# Patient Record
Sex: FEMALE | Race: WHITE
[De-identification: names, ages, dates, MRNs, and addresses within clinical notes are randomized per-mention and may not be internally consistent; named-entity substitution may affect disease eponyms.]

---

## 2021-10-26 ENCOUNTER — APPOINTMENT (OUTPATIENT)
Dept: OPHTHALMOLOGY | Facility: CLINIC | Age: 61
End: 2021-10-26

## 2021-11-08 ENCOUNTER — APPOINTMENT (OUTPATIENT)
Dept: OBGYN | Facility: CLINIC | Age: 61
End: 2021-11-08

## 2022-06-17 ENCOUNTER — APPOINTMENT (OUTPATIENT)
Dept: OBGYN | Facility: CLINIC | Age: 62
End: 2022-06-17
Payer: COMMERCIAL

## 2022-06-17 VITALS
BODY MASS INDEX: 18.96 KG/M2 | WEIGHT: 125.13 LBS | TEMPERATURE: 97 F | RESPIRATION RATE: 18 BRPM | HEIGHT: 68 IN | HEART RATE: 74 BPM | SYSTOLIC BLOOD PRESSURE: 123 MMHG | DIASTOLIC BLOOD PRESSURE: 66 MMHG

## 2022-06-17 DIAGNOSIS — Z12.31 ENCOUNTER FOR SCREENING MAMMOGRAM FOR MALIGNANT NEOPLASM OF BREAST: ICD-10-CM

## 2022-06-17 DIAGNOSIS — N95.2 POSTMENOPAUSAL ATROPHIC VAGINITIS: ICD-10-CM

## 2022-06-17 DIAGNOSIS — Z82.49 FAMILY HISTORY OF ISCHEMIC HEART DISEASE AND OTHER DISEASES OF THE CIRCULATORY SYSTEM: ICD-10-CM

## 2022-06-17 DIAGNOSIS — Z80.3 FAMILY HISTORY OF MALIGNANT NEOPLASM OF BREAST: ICD-10-CM

## 2022-06-17 DIAGNOSIS — R23.2 FLUSHING: ICD-10-CM

## 2022-06-17 DIAGNOSIS — Z01.419 ENCOUNTER FOR GYNECOLOGICAL EXAMINATION (GENERAL) (ROUTINE) W/OUT ABNORMAL FINDINGS: ICD-10-CM

## 2022-06-17 DIAGNOSIS — Z78.9 OTHER SPECIFIED HEALTH STATUS: ICD-10-CM

## 2022-06-17 PROCEDURE — 99386 PREV VISIT NEW AGE 40-64: CPT

## 2022-06-17 RX ORDER — AMOXICILLIN 875 MG/1
875 TABLET, FILM COATED ORAL
Qty: 14 | Refills: 0 | Status: COMPLETED | COMMUNITY
Start: 2022-05-03

## 2022-06-17 RX ORDER — LEVOTHYROXINE SODIUM 100 UG/1
100 TABLET ORAL
Refills: 0 | Status: ACTIVE | COMMUNITY

## 2022-06-17 RX ORDER — ERYTHROMYCIN 5 MG/G
5 OINTMENT OPHTHALMIC
Qty: 4 | Refills: 0 | Status: COMPLETED | COMMUNITY
Start: 2022-03-29

## 2022-06-17 RX ORDER — LEVOTHYROXINE SODIUM 100 UG/1
100 TABLET ORAL
Qty: 90 | Refills: 0 | Status: COMPLETED | COMMUNITY
Start: 2022-05-25

## 2022-06-17 RX ORDER — ESTRADIOL 0.1 MG/G
0.1 CREAM VAGINAL
Qty: 1 | Refills: 3 | Status: ACTIVE | COMMUNITY
Start: 2022-06-17 | End: 1900-01-01

## 2022-06-17 NOTE — HISTORY OF PRESENT ILLNESS
[Patient reported mammogram was normal] : Patient reported mammogram was normal [Currently Active] : currently active [Men] : men [Vaginal] : vaginal [Patient reported PAP Smear was normal] : Patient reported PAP Smear was normal [TextBox_4] : Former patient of Dr. Sandhu, last saw him 6/14, then moved to [place] and saw someone 5 yrs ago, pap normal, no hx of abn paps. Moved back to .\par C/o pain with intercourse on insertion despite using lubricant.\par Had BMD done last year due to stress fx from running, BMD showed osteopenia. She takes vit d and exercises.\par \par hx hemorrhoids [Mammogramdate] : 1.5 yrs ago [PapSmeardate] : 5 yrs ago [BoneDensityDate] : 1 yr ago [TextBox_37] : osteopenia [ColonoscopyDate] : never [FreeTextEntry1] : pain on insertion for a few months

## 2022-06-17 NOTE — PHYSICAL EXAM
[Appropriately responsive] : appropriately responsive [Alert] : alert [No Acute Distress] : no acute distress [Soft] : soft [Non-tender] : non-tender [Non-distended] : non-distended [No HSM] : No HSM [No Lesions] : no lesions [No Mass] : no mass [Oriented x3] : oriented x3 [Examination Of The Breasts] : a normal appearance [] : implants [No Masses] : no breast masses were palpable [Vulvar Atrophy] : vulvar atrophy [Labia Majora] : normal [Labia Minora] : normal [Atrophy] : atrophy [Normal] : normal [Tenderness] : nontender [Enlarged ___ wks] : not enlarged [Mass ___ cm] : no uterine mass was palpated [Uterine Adnexae] : non-palpable [No Tenderness] : no tenderness [FreeTextEntry5] : pap done [FreeTextEntry9] : guaiac-

## 2022-06-21 LAB — HPV HIGH+LOW RISK DNA PNL CVX: NOT DETECTED

## 2022-06-22 LAB — CYTOLOGY CVX/VAG DOC THIN PREP: ABNORMAL

## 2022-12-17 ENCOUNTER — OFFICE (OUTPATIENT)
Dept: URBAN - METROPOLITAN AREA CLINIC 12 | Facility: CLINIC | Age: 62
Setting detail: OPHTHALMOLOGY
End: 2022-12-17
Payer: COMMERCIAL

## 2022-12-17 DIAGNOSIS — H16.223: ICD-10-CM

## 2022-12-17 DIAGNOSIS — H26.493: ICD-10-CM

## 2022-12-17 PROCEDURE — 92012 INTRM OPH EXAM EST PATIENT: CPT | Performed by: OPTOMETRIST

## 2022-12-17 ASSESSMENT — REFRACTION_AUTOREFRACTION
OS_SPHERE: -0.25
OS_CYLINDER: -0.50
OD_CYLINDER: -0.75
OD_AXIS: 158
OS_AXIS: 179
OD_SPHERE: -1.75

## 2022-12-17 ASSESSMENT — AXIALLENGTH_DERIVED
OD_AL: 24.0617
OS_AL: 23.3305
OD_AL: 24.0617
OD_AL: 29.0087
OS_AL: 28.1592

## 2022-12-17 ASSESSMENT — REFRACTION_CURRENTRX
OD_SPHERE: -18.00
OD_CYLINDER: -2.75
OD_OVR_VA: 20/
OS_VPRISM_DIRECTION: SV
OS_SPHERE: -17.25
OS_OVR_VA: 20/
OS_AXIS: 113
OD_VPRISM_DIRECTION: SV
OD_AXIS: 034
OS_CYLINDER: -3.25

## 2022-12-17 ASSESSMENT — REFRACTION_MANIFEST
OS_ADD: +2.50
OD_VA1: 20/40
OS_SPHERE: PLANO
OD_CYLINDER: -1.25
OD_AXIS: 180
OS_AXIS: 143
OS_VA1: 20/50+1
OD_ADD: +2.50
OS_CYLINDER: -2.00
OD_VA2: 20/30(J2)
OD_CYLINDER: -2.50
OD_AXIS: 025
OD_VA1: 20/40
OS_VA2: 20/30(J2)
OS_CYLINDER: -0.50
OD_SPHERE: -1.50
OS_VA1: 20/40
OS_SPHERE: -10.00
OD_SPHERE: -11.00
OS_AXIS: 150

## 2022-12-17 ASSESSMENT — KERATOMETRY
OS_K1POWER_DIOPTERS: 44.50
OD_K2POWER_DIOPTERS: 45.25
OD_AXISANGLE_DEGREES: 81
OS_K2POWER_DIOPTERS: 45.00
OS_AXISANGLE_DEGREES: 84
OD_K1POWER_DIOPTERS: 43.75
METHOD_AUTO_MANUAL: AUTO

## 2022-12-17 ASSESSMENT — TONOMETRY
OD_IOP_MMHG: 17
OS_IOP_MMHG: 14

## 2022-12-17 ASSESSMENT — VISUAL ACUITY
OS_BCVA: 20/50-1
OD_BCVA: 20/30-2

## 2022-12-17 ASSESSMENT — SPHEQUIV_DERIVED
OS_SPHEQUIV: -0.5
OD_SPHEQUIV: -12.25
OD_SPHEQUIV: -2.125
OS_SPHEQUIV: -11
OD_SPHEQUIV: -2.125

## 2022-12-17 ASSESSMENT — CONFRONTATIONAL VISUAL FIELD TEST (CVF)
OS_FINDINGS: FULL
OD_FINDINGS: FULL

## 2022-12-17 ASSESSMENT — SUPERFICIAL PUNCTATE KERATITIS (SPK)
OD_SPK: T 1+
OS_SPK: T 1+

## 2022-12-21 ENCOUNTER — OFFICE (OUTPATIENT)
Dept: URBAN - METROPOLITAN AREA CLINIC 12 | Facility: CLINIC | Age: 62
Setting detail: OPHTHALMOLOGY
End: 2022-12-21
Payer: COMMERCIAL

## 2022-12-21 DIAGNOSIS — Z96.1: ICD-10-CM

## 2022-12-21 DIAGNOSIS — H44.23: ICD-10-CM

## 2022-12-21 DIAGNOSIS — H26.493: ICD-10-CM

## 2022-12-21 DIAGNOSIS — H43.393: ICD-10-CM

## 2022-12-21 DIAGNOSIS — H16.223: ICD-10-CM

## 2022-12-21 DIAGNOSIS — H35.371: ICD-10-CM

## 2022-12-21 DIAGNOSIS — H35.413: ICD-10-CM

## 2022-12-21 PROCEDURE — 92014 COMPRE OPH EXAM EST PT 1/>: CPT | Performed by: OPTOMETRIST

## 2022-12-21 PROCEDURE — 92134 CPTRZ OPH DX IMG PST SGM RTA: CPT | Performed by: OPTOMETRIST

## 2022-12-21 ASSESSMENT — SPHEQUIV_DERIVED
OS_SPHEQUIV: -11
OD_SPHEQUIV: -1.75
OD_SPHEQUIV: -1.875
OS_SPHEQUIV: -0.75
OD_SPHEQUIV: -12.25

## 2022-12-21 ASSESSMENT — VISUAL ACUITY
OD_BCVA: 20/30-2
OS_BCVA: 20/50+2

## 2022-12-21 ASSESSMENT — REFRACTION_MANIFEST
OS_AXIS: 160
OS_AXIS: 150
OS_CYLINDER: -2.00
OD_CYLINDER: -2.50
OS_CYLINDER: -0.50
OD_VA1: 20/40
OS_VA1: 20/50+1
OD_VA2: 20/30(J2)
OD_VA1: 20/40
OD_AXIS: 170
OS_SPHERE: -0.50
OD_AXIS: 025
OU_VA: 20/30-
OS_SPHERE: -10.00
OS_VA2: 20/30(J2)
OD_SPHERE: -1.50
OS_VA1: 20/40
OD_CYLINDER: -0.50
OD_SPHERE: -11.00

## 2022-12-21 ASSESSMENT — REFRACTION_AUTOREFRACTION
OD_AXIS: 168
OS_AXIS: 162
OD_SPHERE: -1.50
OD_CYLINDER: -0.75
OS_CYLINDER: -0.75
OS_SPHERE: PLANO

## 2022-12-21 ASSESSMENT — AXIALLENGTH_DERIVED
OD_AL: 23.9107
OD_AL: 29.0087
OS_AL: 28.2248
OS_AL: 23.4715
OD_AL: 23.9608

## 2022-12-21 ASSESSMENT — REFRACTION_CURRENTRX
OS_SPHERE: -17.25
OD_VPRISM_DIRECTION: SV
OS_CYLINDER: -3.25
OS_AXIS: 113
OD_CYLINDER: -2.75
OD_SPHERE: -18.00
OS_OVR_VA: 20/
OD_OVR_VA: 20/
OD_AXIS: 034
OS_VPRISM_DIRECTION: SV

## 2022-12-21 ASSESSMENT — TONOMETRY
OS_IOP_MMHG: 14
OD_IOP_MMHG: 14

## 2022-12-21 ASSESSMENT — SUPERFICIAL PUNCTATE KERATITIS (SPK)
OD_SPK: T 1+
OS_SPK: T 1+

## 2022-12-21 ASSESSMENT — KERATOMETRY
OD_AXISANGLE_DEGREES: 066
OD_K1POWER_DIOPTERS: 44.25
OD_K2POWER_DIOPTERS: 44.75
OS_AXISANGLE_DEGREES: 086
METHOD_AUTO_MANUAL: AUTO
OS_K2POWER_DIOPTERS: 45.00
OS_K1POWER_DIOPTERS: 44.25

## 2022-12-21 ASSESSMENT — CONFRONTATIONAL VISUAL FIELD TEST (CVF)
OD_FINDINGS: FULL
OS_FINDINGS: FULL

## 2023-06-22 ENCOUNTER — OFFICE (OUTPATIENT)
Dept: URBAN - METROPOLITAN AREA CLINIC 12 | Facility: CLINIC | Age: 63
Setting detail: OPHTHALMOLOGY
End: 2023-06-22
Payer: COMMERCIAL

## 2023-06-22 DIAGNOSIS — H44.23: ICD-10-CM

## 2023-06-22 DIAGNOSIS — H35.371: ICD-10-CM

## 2023-06-22 DIAGNOSIS — H26.493: ICD-10-CM

## 2023-06-22 DIAGNOSIS — Z96.1: ICD-10-CM

## 2023-06-22 DIAGNOSIS — H16.223: ICD-10-CM

## 2023-06-22 DIAGNOSIS — H43.393: ICD-10-CM

## 2023-06-22 DIAGNOSIS — H35.413: ICD-10-CM

## 2023-06-22 PROCEDURE — 92250 FUNDUS PHOTOGRAPHY W/I&R: CPT | Performed by: OPHTHALMOLOGY

## 2023-06-22 PROCEDURE — 99214 OFFICE O/P EST MOD 30 MIN: CPT | Performed by: OPHTHALMOLOGY

## 2023-06-22 ASSESSMENT — REFRACTION_CURRENTRX
OS_AXIS: 113
OD_AXIS: 034
OD_VPRISM_DIRECTION: SV
OS_SPHERE: -17.25
OD_SPHERE: -18.00
OS_OVR_VA: 20/
OD_CYLINDER: -2.75
OS_CYLINDER: -3.25
OS_VPRISM_DIRECTION: SV
OD_OVR_VA: 20/

## 2023-06-22 ASSESSMENT — AXIALLENGTH_DERIVED
OS_AL: 23.4687
OD_AL: 29.0087
OD_AL: 24.1634
OS_AL: 28.2907

## 2023-06-22 ASSESSMENT — KERATOMETRY
OD_K1POWER_DIOPTERS: 44.00
OS_AXISANGLE_DEGREES: 092
OS_K1POWER_DIOPTERS: 44.00
METHOD_AUTO_MANUAL: AUTO
OD_K2POWER_DIOPTERS: 45.00
OD_AXISANGLE_DEGREES: 086
OS_K2POWER_DIOPTERS: 45.00

## 2023-06-22 ASSESSMENT — REFRACTION_AUTOREFRACTION
OD_CYLINDER: -1.25
OD_AXIS: 013
OS_SPHERE: -0.25
OD_SPHERE: -1.75
OS_CYLINDER: -0.75
OS_AXIS: 178

## 2023-06-22 ASSESSMENT — CONFRONTATIONAL VISUAL FIELD TEST (CVF)
OS_FINDINGS: FULL
OD_FINDINGS: FULL

## 2023-06-22 ASSESSMENT — SPHEQUIV_DERIVED
OS_SPHEQUIV: -11
OD_SPHEQUIV: -12.25
OD_SPHEQUIV: -2.375
OS_SPHEQUIV: -0.625

## 2023-06-22 ASSESSMENT — VISUAL ACUITY
OD_BCVA: 20/60
OS_BCVA: 20/50+2

## 2023-06-22 ASSESSMENT — REFRACTION_MANIFEST
OS_CYLINDER: -2.00
OD_AXIS: 025
OS_VA1: 20/50+1
OS_SPHERE: -10.00
OD_CYLINDER: -2.50
OD_SPHERE: -11.00
OD_VA1: 20/40
OS_AXIS: 150

## 2023-06-22 ASSESSMENT — SUPERFICIAL PUNCTATE KERATITIS (SPK)
OD_SPK: T 1+
OS_SPK: T 1+

## 2023-06-22 ASSESSMENT — TONOMETRY
OD_IOP_MMHG: 11
OS_IOP_MMHG: 15

## 2023-07-07 ENCOUNTER — ASC (OUTPATIENT)
Dept: URBAN - METROPOLITAN AREA SURGERY 8 | Facility: SURGERY | Age: 63
Setting detail: OPHTHALMOLOGY
End: 2023-07-07
Payer: COMMERCIAL

## 2023-07-07 DIAGNOSIS — H26.492: ICD-10-CM

## 2023-07-07 PROCEDURE — 66821 AFTER CATARACT LASER SURGERY: CPT | Performed by: OPHTHALMOLOGY

## 2023-07-07 ASSESSMENT — SUPERFICIAL PUNCTATE KERATITIS (SPK)
OS_SPK: T 1+
OD_SPK: T 1+

## 2023-07-07 ASSESSMENT — CONFRONTATIONAL VISUAL FIELD TEST (CVF)
OS_FINDINGS: FULL
OD_FINDINGS: FULL

## 2023-07-10 ENCOUNTER — RX ONLY (RX ONLY)
Age: 63
End: 2023-07-10

## 2023-07-10 ASSESSMENT — SPHEQUIV_DERIVED
OS_SPHEQUIV: -11
OS_SPHEQUIV: -0.625
OD_SPHEQUIV: -2.375
OD_SPHEQUIV: -12.25

## 2023-07-10 ASSESSMENT — REFRACTION_CURRENTRX
OD_AXIS: 034
OD_SPHERE: -18.00
OS_SPHERE: -17.25
OS_OVR_VA: 20/
OS_AXIS: 113
OD_OVR_VA: 20/
OS_CYLINDER: -3.25
OS_VPRISM_DIRECTION: SV
OD_VPRISM_DIRECTION: SV
OD_CYLINDER: -2.75

## 2023-07-10 ASSESSMENT — AXIALLENGTH_DERIVED
OD_AL: 24.1634
OS_AL: 28.2907
OS_AL: 23.4687
OD_AL: 29.0087

## 2023-07-10 ASSESSMENT — KERATOMETRY
OD_K2POWER_DIOPTERS: 45.00
OS_AXISANGLE_DEGREES: 092
METHOD_AUTO_MANUAL: AUTO
OS_K1POWER_DIOPTERS: 44.00
OD_AXISANGLE_DEGREES: 086
OS_K2POWER_DIOPTERS: 45.00
OD_K1POWER_DIOPTERS: 44.00

## 2023-07-10 ASSESSMENT — REFRACTION_MANIFEST
OS_SPHERE: -10.00
OS_CYLINDER: -2.00
OD_AXIS: 025
OS_VA1: 20/50+1
OS_AXIS: 150
OD_SPHERE: -11.00
OD_VA1: 20/40
OD_CYLINDER: -2.50

## 2023-07-10 ASSESSMENT — REFRACTION_AUTOREFRACTION
OS_SPHERE: -0.25
OS_AXIS: 178
OD_SPHERE: -1.75
OD_CYLINDER: -1.25
OD_AXIS: 013
OS_CYLINDER: -0.75

## 2023-07-10 ASSESSMENT — VISUAL ACUITY
OD_BCVA: 20/60
OS_BCVA: 20/50+2

## 2023-08-04 ENCOUNTER — RX ONLY (RX ONLY)
Age: 63
End: 2023-08-04

## 2023-08-04 ENCOUNTER — ASC (OUTPATIENT)
Dept: URBAN - METROPOLITAN AREA SURGERY 8 | Facility: SURGERY | Age: 63
Setting detail: OPHTHALMOLOGY
End: 2023-08-04
Payer: COMMERCIAL

## 2023-08-04 DIAGNOSIS — H26.491: ICD-10-CM

## 2023-08-04 PROCEDURE — 66821 AFTER CATARACT LASER SURGERY: CPT | Performed by: OPHTHALMOLOGY

## 2023-08-04 ASSESSMENT — REFRACTION_AUTOREFRACTION
OD_SPHERE: -1.75
OS_CYLINDER: -0.75
OS_AXIS: 178
OS_SPHERE: -0.25
OD_CYLINDER: -1.25
OD_AXIS: 013

## 2023-08-04 ASSESSMENT — REFRACTION_CURRENTRX
OS_AXIS: 113
OS_OVR_VA: 20/
OD_VPRISM_DIRECTION: SV
OS_VPRISM_DIRECTION: SV
OD_AXIS: 034
OS_CYLINDER: -3.25
OD_CYLINDER: -2.75
OD_OVR_VA: 20/
OD_SPHERE: -18.00
OS_SPHERE: -17.25

## 2023-08-04 ASSESSMENT — KERATOMETRY
OS_K1POWER_DIOPTERS: 44.00
METHOD_AUTO_MANUAL: AUTO
OD_K1POWER_DIOPTERS: 44.00
OS_AXISANGLE_DEGREES: 092
OS_K2POWER_DIOPTERS: 45.00
OD_AXISANGLE_DEGREES: 086
OD_K2POWER_DIOPTERS: 45.00

## 2023-08-04 ASSESSMENT — REFRACTION_MANIFEST
OD_SPHERE: -11.00
OD_CYLINDER: -2.50
OS_VA1: 20/50+1
OS_SPHERE: -10.00
OD_AXIS: 025
OS_CYLINDER: -2.00
OS_AXIS: 150
OD_VA1: 20/40

## 2023-08-04 ASSESSMENT — SUPERFICIAL PUNCTATE KERATITIS (SPK)
OS_SPK: T 1+
OD_SPK: T 1+

## 2023-08-04 ASSESSMENT — VISUAL ACUITY
OD_BCVA: 20/60
OS_BCVA: 20/50+2

## 2023-08-04 ASSESSMENT — AXIALLENGTH_DERIVED
OD_AL: 24.1634
OS_AL: 23.4687
OS_AL: 28.2907
OD_AL: 29.0087

## 2023-08-04 ASSESSMENT — SPHEQUIV_DERIVED
OD_SPHEQUIV: -2.375
OD_SPHEQUIV: -12.25
OS_SPHEQUIV: -11
OS_SPHEQUIV: -0.625

## 2023-08-04 ASSESSMENT — CONFRONTATIONAL VISUAL FIELD TEST (CVF)
OS_FINDINGS: FULL
OD_FINDINGS: FULL

## 2023-08-22 ENCOUNTER — OFFICE (OUTPATIENT)
Dept: URBAN - METROPOLITAN AREA CLINIC 12 | Facility: CLINIC | Age: 63
Setting detail: OPHTHALMOLOGY
End: 2023-08-22
Payer: COMMERCIAL

## 2023-08-22 DIAGNOSIS — H16.223: ICD-10-CM

## 2023-08-22 DIAGNOSIS — Z96.1: ICD-10-CM

## 2023-08-22 PROCEDURE — 99024 POSTOP FOLLOW-UP VISIT: CPT | Performed by: OPTOMETRIST

## 2023-08-22 ASSESSMENT — SPHEQUIV_DERIVED
OS_SPHEQUIV: 0
OD_SPHEQUIV: -2.75
OS_SPHEQUIV: 0
OD_SPHEQUIV: -2.75

## 2023-08-22 ASSESSMENT — REFRACTION_AUTOREFRACTION
OD_AXIS: 024
OS_AXIS: 106
OS_SPHERE: +0.25
OS_CYLINDER: -0.50
OD_SPHERE: -2.25
OD_CYLINDER: -1.00

## 2023-08-22 ASSESSMENT — REFRACTION_CURRENTRX
OD_VPRISM_DIRECTION: SV
OD_AXIS: 034
OD_CYLINDER: -2.75
OS_SPHERE: -17.25
OS_OVR_VA: 20/
OS_CYLINDER: -3.25
OS_AXIS: 113
OS_VPRISM_DIRECTION: SV
OD_SPHERE: -18.00
OD_OVR_VA: 20/

## 2023-08-22 ASSESSMENT — REFRACTION_MANIFEST
OD_AXIS: 025
OS_CYLINDER: -0.50
OD_VA1: 20/40
OD_SPHERE: -2.25
OS_VA1: 20/50+1
OD_CYLINDER: -1.00
OS_SPHERE: +0.25
OS_AXIS: 105

## 2023-08-22 ASSESSMENT — KERATOMETRY
METHOD_AUTO_MANUAL: AUTO
OD_AXISANGLE_DEGREES: 095
OS_K2POWER_DIOPTERS: 44.25
OS_K1POWER_DIOPTERS: 44.00
OS_AXISANGLE_DEGREES: 081
OD_K2POWER_DIOPTERS: 44.50
OD_K1POWER_DIOPTERS: 43.75

## 2023-08-22 ASSESSMENT — AXIALLENGTH_DERIVED
OD_AL: 24.4649
OS_AL: 23.3645
OS_AL: 23.3645
OD_AL: 24.4649

## 2023-08-22 ASSESSMENT — CONFRONTATIONAL VISUAL FIELD TEST (CVF)
OS_FINDINGS: FULL
OD_FINDINGS: FULL

## 2023-08-22 ASSESSMENT — SUPERFICIAL PUNCTATE KERATITIS (SPK)
OS_SPK: 2+
OD_SPK: 1+

## 2023-08-22 ASSESSMENT — VISUAL ACUITY
OD_BCVA: 20/50+2
OS_BCVA: 20/50+2

## 2023-08-22 ASSESSMENT — TONOMETRY
OS_IOP_MMHG: 15
OD_IOP_MMHG: 14

## 2023-09-13 ENCOUNTER — APPOINTMENT (OUTPATIENT)
Dept: ORTHOPEDIC SURGERY | Facility: CLINIC | Age: 63
End: 2023-09-13
Payer: COMMERCIAL

## 2023-09-13 VITALS — WEIGHT: 125 LBS | BODY MASS INDEX: 18.94 KG/M2 | HEIGHT: 68 IN

## 2023-09-13 DIAGNOSIS — M54.12 RADICULOPATHY, CERVICAL REGION: ICD-10-CM

## 2023-09-13 DIAGNOSIS — M54.2 CERVICALGIA: ICD-10-CM

## 2023-09-13 PROCEDURE — 99204 OFFICE O/P NEW MOD 45 MIN: CPT | Mod: 25

## 2023-09-13 PROCEDURE — 73010 X-RAY EXAM OF SHOULDER BLADE: CPT | Mod: LT

## 2023-09-13 PROCEDURE — 99214 OFFICE O/P EST MOD 30 MIN: CPT | Mod: 25

## 2023-09-13 PROCEDURE — 73030 X-RAY EXAM OF SHOULDER: CPT | Mod: LT

## 2023-09-13 RX ORDER — METHYLPREDNISOLONE 4 MG/1
4 TABLET ORAL
Qty: 1 | Refills: 0 | Status: ACTIVE | COMMUNITY
Start: 2023-09-13 | End: 1900-01-01

## 2023-10-04 ENCOUNTER — APPOINTMENT (OUTPATIENT)
Dept: ORTHOPEDIC SURGERY | Facility: CLINIC | Age: 63
End: 2023-10-04
Payer: COMMERCIAL

## 2023-10-04 VITALS — HEIGHT: 68 IN | BODY MASS INDEX: 18.94 KG/M2 | WEIGHT: 125 LBS

## 2023-10-04 DIAGNOSIS — M25.512 PAIN IN LEFT SHOULDER: ICD-10-CM

## 2023-10-04 PROCEDURE — 99213 OFFICE O/P EST LOW 20 MIN: CPT

## 2023-10-18 ENCOUNTER — APPOINTMENT (OUTPATIENT)
Dept: ORTHOPEDIC SURGERY | Facility: CLINIC | Age: 63
End: 2023-10-18

## 2023-10-25 ENCOUNTER — APPOINTMENT (OUTPATIENT)
Dept: ORTHOPEDIC SURGERY | Facility: CLINIC | Age: 63
End: 2023-10-25

## 2023-11-03 ENCOUNTER — APPOINTMENT (OUTPATIENT)
Dept: MRI IMAGING | Facility: CLINIC | Age: 63
End: 2023-11-03

## 2023-11-22 ENCOUNTER — OFFICE (OUTPATIENT)
Dept: URBAN - METROPOLITAN AREA CLINIC 12 | Facility: CLINIC | Age: 63
Setting detail: OPHTHALMOLOGY
End: 2023-11-22
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
## 2023-11-22 PROCEDURE — NO SHOW FE NO SHOW FEE: Performed by: OPTOMETRIST

## 2024-01-22 ENCOUNTER — APPOINTMENT (OUTPATIENT)
Dept: ENDOCRINOLOGY | Facility: CLINIC | Age: 64
End: 2024-01-22

## 2024-05-31 ENCOUNTER — EMERGENCY (EMERGENCY)
Facility: HOSPITAL | Age: 64
LOS: 0 days | Discharge: HOME | End: 2024-06-01
Attending: STUDENT IN AN ORGANIZED HEALTH CARE EDUCATION/TRAINING PROGRAM
Payer: COMMERCIAL

## 2024-05-31 VITALS — HEIGHT: 68 IN

## 2024-05-31 DIAGNOSIS — R06.02 SHORTNESS OF BREATH: ICD-10-CM

## 2024-05-31 DIAGNOSIS — E89.0 POSTPROCEDURAL HYPOTHYROIDISM: ICD-10-CM

## 2024-05-31 LAB
BASOPHILS # BLD AUTO: 0.06 K/UL — SIGNIFICANT CHANGE UP (ref 0–0.2)
BASOPHILS NFR BLD AUTO: 1.3 % — SIGNIFICANT CHANGE UP (ref 0–2)
EOSINOPHIL # BLD AUTO: 0.11 K/UL — SIGNIFICANT CHANGE UP (ref 0–0.5)
EOSINOPHIL NFR BLD AUTO: 2.4 % — SIGNIFICANT CHANGE UP (ref 0–6)
HCT VFR BLD CALC: 38.8 % — SIGNIFICANT CHANGE UP (ref 34.5–45)
HGB BLD-MCNC: 13.4 G/DL — SIGNIFICANT CHANGE UP (ref 11.5–15.5)
IMM GRANULOCYTES NFR BLD AUTO: 0 % — SIGNIFICANT CHANGE UP (ref 0–0.9)
LYMPHOCYTES # BLD AUTO: 1.61 K/UL — SIGNIFICANT CHANGE UP (ref 1–3.3)
LYMPHOCYTES # BLD AUTO: 34.9 % — SIGNIFICANT CHANGE UP (ref 13–44)
MCHC RBC-ENTMCNC: 31.9 PG — SIGNIFICANT CHANGE UP (ref 27–34)
MCHC RBC-ENTMCNC: 34.5 GM/DL — SIGNIFICANT CHANGE UP (ref 32–36)
MCV RBC AUTO: 92.4 FL — SIGNIFICANT CHANGE UP (ref 80–100)
MONOCYTES # BLD AUTO: 0.49 K/UL — SIGNIFICANT CHANGE UP (ref 0–0.9)
MONOCYTES NFR BLD AUTO: 10.6 % — SIGNIFICANT CHANGE UP (ref 2–14)
NEUTROPHILS # BLD AUTO: 2.34 K/UL — SIGNIFICANT CHANGE UP (ref 1.8–7.4)
NEUTROPHILS NFR BLD AUTO: 50.8 % — SIGNIFICANT CHANGE UP (ref 43–77)
PLATELET # BLD AUTO: 225 K/UL — SIGNIFICANT CHANGE UP (ref 150–400)
RBC # BLD: 4.2 M/UL — SIGNIFICANT CHANGE UP (ref 3.8–5.2)
RBC # FLD: 12.7 % — SIGNIFICANT CHANGE UP (ref 10.3–14.5)
WBC # BLD: 4.61 K/UL — SIGNIFICANT CHANGE UP (ref 3.8–10.5)
WBC # FLD AUTO: 4.61 K/UL — SIGNIFICANT CHANGE UP (ref 3.8–10.5)

## 2024-05-31 PROCEDURE — 85025 COMPLETE CBC W/AUTO DIFF WBC: CPT

## 2024-05-31 PROCEDURE — 84484 ASSAY OF TROPONIN QUANT: CPT

## 2024-05-31 PROCEDURE — 93010 ELECTROCARDIOGRAM REPORT: CPT

## 2024-05-31 PROCEDURE — 99285 EMERGENCY DEPT VISIT HI MDM: CPT | Mod: 25

## 2024-05-31 PROCEDURE — 99285 EMERGENCY DEPT VISIT HI MDM: CPT

## 2024-05-31 PROCEDURE — 71045 X-RAY EXAM CHEST 1 VIEW: CPT

## 2024-05-31 PROCEDURE — 80053 COMPREHEN METABOLIC PANEL: CPT

## 2024-05-31 PROCEDURE — 36415 COLL VENOUS BLD VENIPUNCTURE: CPT

## 2024-05-31 PROCEDURE — 85610 PROTHROMBIN TIME: CPT

## 2024-05-31 PROCEDURE — 85730 THROMBOPLASTIN TIME PARTIAL: CPT

## 2024-05-31 PROCEDURE — 93005 ELECTROCARDIOGRAM TRACING: CPT

## 2024-05-31 PROCEDURE — 71045 X-RAY EXAM CHEST 1 VIEW: CPT | Mod: 26

## 2024-05-31 NOTE — ED PROVIDER NOTE - PROGRESS NOTE DETAILS
Kristal Up MD, Attending  results discussed with pt, all questions answered, pt and  at bedside agreeable to outpt followup with PMD and cardiologist.   SOB resolved

## 2024-05-31 NOTE — ED PROVIDER NOTE - CLINICAL SUMMARY MEDICAL DECISION MAKING FREE TEXT BOX
DDX included but no limited to: Anemia, electrolyte abnormality, lower suspicion for PE and ACS. Plan ED chest pain w/u, IV hydration. Anticipate outpatient cardiology f/u

## 2024-05-31 NOTE — ED PROVIDER NOTE - OBJECTIVE STATEMENT
63 year old female with PMHx of hypothyroid s/p thyroidectomy on Synthroid; presents to ED c/o SOB while eating dinner this evening, states she was having difficulty breathing while she was eating and unable to take a full breath, and notes when she got up from sitting felt near-syncopal. No recent travel. No history of blood clots. Patient states she feels much better. 63 year old female with PMHx of hypothyroid s/p thyroidectomy on Synthroid; presents to ED c/o SOB while eating dinner this evening, states she was having difficulty breathing while she was eating and unable to take a full breath, and notes when she got up from sitting felt near-syncopal. No recent travel. No history of blood clots. Patient states she feels much better at time of evaluation.   Per  at bedside, pt works out daily - wakes up at Am, runs several miles then does [employer].

## 2024-05-31 NOTE — ED PROVIDER NOTE - NSFOLLOWUPINSTRUCTIONS_ED_ALL_ED_FT
** Stay hydrated and follow up with your primary care doctor in the next 72 hours.   ** Go to the nearest Emergency Department if you experience any new or concerning symptoms, such as:   - chest pain  - difficulty breathing  - passing out  - unable to eat or drink  - unable to move or feel part of your body  - fever, chills

## 2024-05-31 NOTE — ED PROVIDER NOTE - PHYSICAL EXAMINATION
Kristal Up MD Attending  GEN: Patient awake alert NAD.   HEENT: normocephalic, atraumatic, EOMI, no scleral icterus, moist MM  CARDIAC: RRR, S1, S2, no murmur.   PULM: CTA B/L no wheeze, rhonchi, rales.   ABD: soft NT, ND, no rebound no guarding, no CVA tenderness.   MSK: Moving all extremities, no edema. 5/5 strength and full ROM in all extremities.     NEURO: A&Ox3, gait normal, no focal neurological deficits, CN 2-12 grossly intact  SKIN: warm, dry, no rash.

## 2024-05-31 NOTE — ED PROVIDER NOTE - PATIENT PORTAL LINK FT
You can access the FollowMyHealth Patient Portal offered by [facility] by registering at the following website: [email or web address]. By joining [employer]’s FollowMyHealth portal, you will also be able to view your health information using other applications (apps) compatible with our system.

## 2024-05-31 NOTE — ED ADULT TRIAGE NOTE - CHIEF COMPLAINT QUOTE
Pt presents to ED c/o SOB starting x1 hr ago. Pt reports symptoms started when she was eating dinner. Pt denies chest pain, N/V/D. Pt endorses dizziness. Pt's oxygen saturation 98% on room air. Pt sent for EKG. NKDA.

## 2024-06-01 VITALS
SYSTOLIC BLOOD PRESSURE: 119 MMHG | DIASTOLIC BLOOD PRESSURE: 66 MMHG | RESPIRATION RATE: 18 BRPM | HEART RATE: 61 BPM | TEMPERATURE: 98 F | OXYGEN SATURATION: 100 %

## 2024-06-01 LAB
ALBUMIN SERPL ELPH-MCNC: 3.8 G/DL — SIGNIFICANT CHANGE UP (ref 3.3–5)
ALP SERPL-CCNC: 63 U/L — SIGNIFICANT CHANGE UP (ref 40–120)
ALT FLD-CCNC: 29 U/L — SIGNIFICANT CHANGE UP (ref 12–78)
ANION GAP SERPL CALC-SCNC: 5 MMOL/L — SIGNIFICANT CHANGE UP (ref 5–17)
APTT BLD: 23.9 SEC — LOW (ref 24.5–35.6)
AST SERPL-CCNC: 26 U/L — SIGNIFICANT CHANGE UP (ref 15–37)
BILIRUB SERPL-MCNC: 0.3 MG/DL — SIGNIFICANT CHANGE UP (ref 0.2–1.2)
BUN SERPL-MCNC: 23 MG/DL — SIGNIFICANT CHANGE UP (ref 7–23)
CALCIUM SERPL-MCNC: 9.3 MG/DL — SIGNIFICANT CHANGE UP (ref 8.5–10.1)
CHLORIDE SERPL-SCNC: 107 MMOL/L — SIGNIFICANT CHANGE UP (ref 96–108)
CO2 SERPL-SCNC: 29 MMOL/L — SIGNIFICANT CHANGE UP (ref 22–31)
CREAT SERPL-MCNC: 1.03 MG/DL — SIGNIFICANT CHANGE UP (ref 0.5–1.3)
EGFR: 61 ML/MIN/1.73M2 — SIGNIFICANT CHANGE UP
GLUCOSE SERPL-MCNC: 74 MG/DL — SIGNIFICANT CHANGE UP (ref 70–99)
INR BLD: 0.94 RATIO — SIGNIFICANT CHANGE UP (ref 0.85–1.18)
POTASSIUM SERPL-MCNC: 3.7 MMOL/L — SIGNIFICANT CHANGE UP (ref 3.5–5.3)
POTASSIUM SERPL-SCNC: 3.7 MMOL/L — SIGNIFICANT CHANGE UP (ref 3.5–5.3)
PROT SERPL-MCNC: 7 GM/DL — SIGNIFICANT CHANGE UP (ref 6–8.3)
PROTHROM AB SERPL-ACNC: 10.6 SEC — SIGNIFICANT CHANGE UP (ref 9.5–13)
SODIUM SERPL-SCNC: 141 MMOL/L — SIGNIFICANT CHANGE UP (ref 135–145)
TROPONIN I, HIGH SENSITIVITY RESULT: 8.03 NG/L — SIGNIFICANT CHANGE UP

## 2024-06-01 NOTE — ED ADULT NURSE NOTE - OBJECTIVE STATEMENT
Pt arrives ambulatory to the ED c/o SOB while eating dinner this evening, states she was having difficulty breathing while she was eating and unable to take a full breath, and notes when she got up from sitting felt near-syncopal. Pt denies falling, denies any hx of blood clots. Pt states she feels at her baseline. breathing is normal and unlabored, speaking in full sentences. Denies any SOB, diff breathing, chest pain or any complaints at this time. Pt is able to ambulate independently. AAO x4, GCS 15.

## 2024-06-19 ENCOUNTER — NON-APPOINTMENT (OUTPATIENT)
Age: 64
End: 2024-06-19

## 2025-05-20 ENCOUNTER — APPOINTMENT (OUTPATIENT)
Dept: ORTHOPEDIC SURGERY | Facility: CLINIC | Age: 65
End: 2025-05-20

## 2025-05-20 VITALS — BODY MASS INDEX: 18.94 KG/M2 | HEIGHT: 68 IN | WEIGHT: 125 LBS

## 2025-05-20 DIAGNOSIS — S93.492A SPRAIN OF OTHER LIGAMENT OF LEFT ANKLE, INITIAL ENCOUNTER: ICD-10-CM

## 2025-05-20 DIAGNOSIS — Z78.9 OTHER SPECIFIED HEALTH STATUS: ICD-10-CM

## 2025-05-20 PROCEDURE — 99203 OFFICE O/P NEW LOW 30 MIN: CPT | Mod: 25

## 2025-05-20 PROCEDURE — 73600 X-RAY EXAM OF ANKLE: CPT | Mod: LT

## 2025-05-20 PROCEDURE — 73630 X-RAY EXAM OF FOOT: CPT | Mod: LT

## 2025-07-04 ENCOUNTER — EMERGENCY (EMERGENCY)
Facility: HOSPITAL | Age: 65
LOS: 0 days | Discharge: HOME | End: 2025-07-04
Attending: EMERGENCY MEDICINE
Payer: MEDICARE

## 2025-07-04 VITALS
HEART RATE: 76 BPM | SYSTOLIC BLOOD PRESSURE: 124 MMHG | DIASTOLIC BLOOD PRESSURE: 74 MMHG | RESPIRATION RATE: 18 BRPM | OXYGEN SATURATION: 100 % | TEMPERATURE: 98 F

## 2025-07-04 VITALS — WEIGHT: 130.07 LBS

## 2025-07-04 PROCEDURE — 99283 EMERGENCY DEPT VISIT LOW MDM: CPT | Mod: 25

## 2025-07-04 PROCEDURE — 12001 RPR S/N/AX/GEN/TRNK 2.5CM/<: CPT

## 2025-07-04 PROCEDURE — 90715 TDAP VACCINE 7 YRS/> IM: CPT

## 2025-07-04 PROCEDURE — 90471 IMMUNIZATION ADMIN: CPT

## 2025-07-04 PROCEDURE — 99284 EMERGENCY DEPT VISIT MOD MDM: CPT | Mod: 25

## 2025-07-04 NOTE — ED ADULT TRIAGE NOTE - CHIEF COMPLAINT QUOTE
Patient presents to the ER with complaints of laceration to right medial ankle from a beer bottle. Up to date on tetanus. Gauze applied.

## 2025-07-04 NOTE — ED STATDOCS - CLINICAL SUMMARY MEDICAL DECISION MAKING FREE TEXT BOX
65 y/o female with no pertinent HX presents to ED with laceration on right foot. Repair laceration and give Tetanus.

## 2025-07-04 NOTE — ED STATDOCS - OBJECTIVE STATEMENT
65 y/o female with no pertinent HX presents to ED. Patient states she dropped beer bottle which broke open, stepped on glass shard with right foot and currently has bleeding laceration on it.

## 2025-07-04 NOTE — ED STATDOCS - PATIENT PORTAL LINK FT
You can access the FollowMyHealth Patient Portal offered by [facility] by registering at the following website: [email or web address]. By joining [employer]’s FollowMyHealth portal, you will also be able to view your health information using other applications (apps) compatible with our system.

## 2025-07-04 NOTE — ED STATDOCS - NSFOLLOWUPINSTRUCTIONS_ED_ALL_ED_FT
Follow up with your primary care provider within 48-72 hours for wound check. Keep sutures covered and dry for 24 hours then clean with soap and water daily.  Apply bacitracin and cover.  Return to ED for suture removal 10-14 days. Any increased pain, redness, streaking (red lines), swelling, fever, chills return to ER.     Laceration    A laceration is a cut that goes through all of the layers of the skin and into the tissue that is right under the skin. Some lacerations heal on their own. Others need to be closed with skin adhesive strips, skin glue, stitches (sutures), or staples. Proper laceration care minimizes the risk of infection and helps the laceration to heal better.  If non-absorbable stitches or staples have been placed, they must be taken out within the time frame instructed by your healthcare provider.    SEEK IMMEDIATE MEDICAL CARE IF YOU HAVE ANY OF THE FOLLOWING SYMPTOMS: swelling around the wound, worsening pain, drainage from the wound, red streaking going away from your wound, inability to move finger or toe near the laceration, or discoloration of skin near the laceration.

## 2025-07-04 NOTE — ED ADULT NURSE NOTE - OBJECTIVE STATEMENT
65 y/o female presents to ED C/o laceration to R foot after dropping glass bottle on it. No other complaints.

## 2025-07-04 NOTE — ED STATDOCS - PROGRESS NOTE DETAILS
Sidle: lac repaired. tdap given. Discussed with patient need to return to ED if symptoms don't continue to improve or recur or develops any new or worsening symptoms that are of concern.

## 2025-07-04 NOTE — ED STATDOCS - ATTENDING APP SHARED VISIT CONTRIBUTION OF CARE
I, Dr. Roz Rutherford DO, personally saw the patient with LAURENT.  I have personally performed a face to face diagnostic evaluation on this patient.  I have reviewed the LAURENT note and agree with the history, exam, and plan of care, except as noted.  I personally saw the patient and performed a substantive portion of the visit including all aspects of the medical decision making.  LANCE Bales DO

## 2025-07-10 ENCOUNTER — NON-APPOINTMENT (OUTPATIENT)
Age: 65
End: 2025-07-10

## 2025-07-11 PROBLEM — Z78.9 OTHER SPECIFIED HEALTH STATUS: Chronic | Status: INACTIVE | Noted: 2025-07-04 | Resolved: 2025-07-11

## 2025-07-16 ENCOUNTER — NON-APPOINTMENT (OUTPATIENT)
Age: 65
End: 2025-07-16

## 2025-07-22 ENCOUNTER — APPOINTMENT (OUTPATIENT)
Dept: ORTHOPEDIC SURGERY | Facility: CLINIC | Age: 65
End: 2025-07-22
Payer: MEDICARE

## 2025-07-22 VITALS — HEIGHT: 68 IN | BODY MASS INDEX: 18.94 KG/M2 | WEIGHT: 125 LBS

## 2025-07-22 DIAGNOSIS — Z00.00 ENCOUNTER FOR GENERAL ADULT MEDICAL EXAMINATION W/OUT ABNORMAL FINDINGS: ICD-10-CM

## 2025-07-22 DIAGNOSIS — S96.921A: ICD-10-CM

## 2025-07-22 PROCEDURE — 73610 X-RAY EXAM OF ANKLE: CPT | Mod: RT

## 2025-07-22 PROCEDURE — 99204 OFFICE O/P NEW MOD 45 MIN: CPT

## 2025-08-08 ENCOUNTER — APPOINTMENT (OUTPATIENT)
Dept: MRI IMAGING | Facility: CLINIC | Age: 65
End: 2025-08-08
Payer: MEDICARE

## 2025-08-08 PROCEDURE — 73721 MRI JNT OF LWR EXTRE W/O DYE: CPT | Mod: RT

## 2025-08-11 ENCOUNTER — NON-APPOINTMENT (OUTPATIENT)
Age: 65
End: 2025-08-11

## 2025-08-12 ENCOUNTER — APPOINTMENT (OUTPATIENT)
Dept: GASTROENTEROLOGY | Facility: CLINIC | Age: 65
End: 2025-08-12
Payer: MEDICARE

## 2025-08-12 VITALS
HEART RATE: 54 BPM | HEIGHT: 68 IN | DIASTOLIC BLOOD PRESSURE: 85 MMHG | BODY MASS INDEX: 18.94 KG/M2 | SYSTOLIC BLOOD PRESSURE: 130 MMHG | WEIGHT: 125 LBS | OXYGEN SATURATION: 100 %

## 2025-08-12 DIAGNOSIS — Z12.11 ENCOUNTER FOR SCREENING FOR MALIGNANT NEOPLASM OF COLON: ICD-10-CM

## 2025-08-12 DIAGNOSIS — Z87.891 PERSONAL HISTORY OF NICOTINE DEPENDENCE: ICD-10-CM

## 2025-08-12 PROCEDURE — 99204 OFFICE O/P NEW MOD 45 MIN: CPT

## 2025-08-12 PROCEDURE — G2211 COMPLEX E/M VISIT ADD ON: CPT

## 2025-08-12 RX ORDER — SODIUM SULFATE, POTASSIUM SULFATE AND MAGNESIUM SULFATE 1.6; 3.13; 17.5 G/177ML; G/177ML; G/177ML
17.5-3.13-1.6 SOLUTION ORAL
Qty: 1 | Refills: 0 | Status: ACTIVE | COMMUNITY
Start: 2025-08-12 | End: 1900-01-01

## 2025-08-19 ENCOUNTER — APPOINTMENT (OUTPATIENT)
Dept: ORTHOPEDIC SURGERY | Facility: CLINIC | Age: 65
End: 2025-08-19
Payer: MEDICARE

## 2025-08-19 VITALS — HEIGHT: 68 IN | BODY MASS INDEX: 18.94 KG/M2 | WEIGHT: 125 LBS

## 2025-08-19 DIAGNOSIS — S96.921A: ICD-10-CM

## 2025-08-19 PROCEDURE — 99214 OFFICE O/P EST MOD 30 MIN: CPT
